# Patient Record
(demographics unavailable — no encounter records)

---

## 2024-10-18 NOTE — DISCUSSION/SUMMARY
[de-identified] : At this time, due to calcific tendinitis of the left shoulder, I recommended a cortisone injection (as noted above), ice and elevation. He will be reassessed in three to four weeks.

## 2024-10-18 NOTE — PROCEDURE
[de-identified] : Indication: Calcific tendinitis of the left shoulder   Consent: At this time, I have recommended an injection into the left shoulder. The risks and benefits of the procedure were discussed with the patient in detail.  Upon verbal consent of the patient, we proceeded with the injection as noted below.   Procedure: After a sterile prep, the patient underwent an injection of 9 mL of 1% Lidocaine (10mg/mL) without epinephrine and 1 mL of triamcinolone acetonide (40mg/mL) into the left shoulder. The patient tolerated the procedure well.  There were no complications.   :  [employer] Drug name:     Triamcinolone Acetonide Injectable Suspension USP NDC #:            [address]-8 Lot #:               [id number] Expiration:      08/31/2025

## 2024-10-18 NOTE — PHYSICAL EXAM
[de-identified] : Right Shoulder:  Range of Motion in Degrees:   	    	                                                   Claimant:          Normal:  	 Abduction (Active)  	                             180       	180 degrees  	 Abduction (Passive)  	                     180     	180 degrees  	 Forward elevation (Active):                  180  	        180 degrees  	 Forward elevation (Passive):               180  	        180 degrees  	 External rotation (Active):  	              45  	          45 degrees  	 External rotation (Passive):  	              45  	          45 degrees  	 Internal rotation (Active):  	             L-1  	              L-1  	 Internal rotation (Passive):  	             L-1  	              L-1  	  No motor weakness to internal rotation, external rotation or abduction in the scapular plane.  Negative crank test.  Negative [place]'s test.  Negative Speeds test. Negative Yergason's test.  Negative cross arm test.  No tenderness to palpation at the AC joint. Negative Rankin sign.  Negative Neers sign.  Negative apprehension. Negative sulcus sign.  No gross neurological or vascular deficits distally.  Skin is intact.  No rashes, scars or lesions. 2+ radial and ulnar pulses. No extra-articular swelling or tenderness.   Left Shoulder:   Limited range of motion secondary to pain. No motor weakness to internal rotation, external rotation or abduction in the scapular plane.  Negative crank test.  Negative [place]'s test.  Negative Speeds test. Negative Yergason's test.  Negative cross arm test.  No tenderness to palpation at the AC joint. Positive Rankin sign.  Positive Neers sign. Negative apprehension. Negative sulcus sign.  No gross neurological or vascular deficits distally.  Skin is intact.  No rashes, scars or lesions. 2+ radial and ulnar pulses. No extra-articular swelling or tenderness.   [de-identified] : Ambulating with a normal gait. [de-identified] : Appearance:  Well-developed, well-nourished male in no acute distress.   [de-identified] : Radiographs, which were taken in the office today, two views of the left shoulder, shows calcific tendinitis.

## 2024-10-18 NOTE — ADDENDUM
[FreeTextEntry1] : This note was written by Verónica Webber on 10/16/2024 acting as a scribe for JAZMINE LEACH III, MD

## 2024-10-18 NOTE — DISCUSSION/SUMMARY
[de-identified] : At this time, due to calcific tendinitis of the left shoulder, I recommended a cortisone injection (as noted above), ice and elevation. He will be reassessed in three to four weeks.

## 2024-10-18 NOTE — PHYSICAL EXAM
[de-identified] : Right Shoulder:  Range of Motion in Degrees:   	    	                                                   Claimant:          Normal:  	 Abduction (Active)  	                             180       	180 degrees  	 Abduction (Passive)  	                     180     	180 degrees  	 Forward elevation (Active):                  180  	        180 degrees  	 Forward elevation (Passive):               180  	        180 degrees  	 External rotation (Active):  	              45  	          45 degrees  	 External rotation (Passive):  	              45  	          45 degrees  	 Internal rotation (Active):  	             L-1  	              L-1  	 Internal rotation (Passive):  	             L-1  	              L-1  	  No motor weakness to internal rotation, external rotation or abduction in the scapular plane.  Negative crank test.  Negative [place]'s test.  Negative Speeds test. Negative Yergason's test.  Negative cross arm test.  No tenderness to palpation at the AC joint. Negative Rankin sign.  Negative Neers sign.  Negative apprehension. Negative sulcus sign.  No gross neurological or vascular deficits distally.  Skin is intact.  No rashes, scars or lesions. 2+ radial and ulnar pulses. No extra-articular swelling or tenderness.   Left Shoulder:   Limited range of motion secondary to pain. No motor weakness to internal rotation, external rotation or abduction in the scapular plane.  Negative crank test.  Negative [place]'s test.  Negative Speeds test. Negative Yergason's test.  Negative cross arm test.  No tenderness to palpation at the AC joint. Positive Rankin sign.  Positive Neers sign. Negative apprehension. Negative sulcus sign.  No gross neurological or vascular deficits distally.  Skin is intact.  No rashes, scars or lesions. 2+ radial and ulnar pulses. No extra-articular swelling or tenderness.   [de-identified] : Ambulating with a normal gait. [de-identified] : Appearance:  Well-developed, well-nourished male in no acute distress.   [de-identified] : Radiographs, which were taken in the office today, two views of the left shoulder, shows calcific tendinitis.

## 2024-10-18 NOTE — HISTORY OF PRESENT ILLNESS
[de-identified] : The patient comes in today with complaints to his left shoulder. He states it started several days prior and is having increasing complaints of pain. He can barely move his shoulder. The patient states the pain is radiating. The patient describes the pain as constant, sharp, extreme and throbbing. The patient notes nothing makes his symptoms better, while doing anything, moving or sitting, makes his symptoms worse. The patient indicates a pain level of 10 on a pain scale of 0-10.

## 2024-10-18 NOTE — PROCEDURE
[de-identified] : Indication: Calcific tendinitis of the left shoulder   Consent: At this time, I have recommended an injection into the left shoulder. The risks and benefits of the procedure were discussed with the patient in detail.  Upon verbal consent of the patient, we proceeded with the injection as noted below.   Procedure: After a sterile prep, the patient underwent an injection of 9 mL of 1% Lidocaine (10mg/mL) without epinephrine and 1 mL of triamcinolone acetonide (40mg/mL) into the left shoulder. The patient tolerated the procedure well.  There were no complications.   :  [employer] Drug name:     Triamcinolone Acetonide Injectable Suspension [employer] NDC #:            [address]-7 Lot #:               [id number] Expiration:      08/31/2025

## 2024-10-18 NOTE — HISTORY OF PRESENT ILLNESS
[de-identified] : The patient comes in today with complaints to his left shoulder. He states it started several days prior and is having increasing complaints of pain. He can barely move his shoulder. The patient states the pain is radiating. The patient describes the pain as constant, sharp, extreme and throbbing. The patient notes nothing makes his symptoms better, while doing anything, moving or sitting, makes his symptoms worse. The patient indicates a pain level of 10 on a pain scale of 0-10.